# Patient Record
Sex: MALE | Race: WHITE | NOT HISPANIC OR LATINO | Employment: FULL TIME | URBAN - METROPOLITAN AREA
[De-identification: names, ages, dates, MRNs, and addresses within clinical notes are randomized per-mention and may not be internally consistent; named-entity substitution may affect disease eponyms.]

---

## 2023-10-18 ENCOUNTER — OFFICE VISIT (OUTPATIENT)
Dept: URGENT CARE | Facility: CLINIC | Age: 38
End: 2023-10-18
Payer: COMMERCIAL

## 2023-10-18 VITALS
RESPIRATION RATE: 14 BRPM | BODY MASS INDEX: 24.92 KG/M2 | HEART RATE: 72 BPM | WEIGHT: 184 LBS | OXYGEN SATURATION: 98 % | DIASTOLIC BLOOD PRESSURE: 88 MMHG | SYSTOLIC BLOOD PRESSURE: 138 MMHG | TEMPERATURE: 97.8 F | HEIGHT: 72 IN

## 2023-10-18 DIAGNOSIS — H66.90 ACUTE OTITIS MEDIA, UNSPECIFIED OTITIS MEDIA TYPE: Primary | ICD-10-CM

## 2023-10-18 PROCEDURE — 1036F TOBACCO NON-USER: CPT | Performed by: PHYSICIAN ASSISTANT

## 2023-10-18 PROCEDURE — 99203 OFFICE O/P NEW LOW 30 MIN: CPT | Performed by: PHYSICIAN ASSISTANT

## 2023-10-18 RX ORDER — FLUTICASONE PROPIONATE 50 MCG
1 SPRAY, SUSPENSION (ML) NASAL
COMMUNITY

## 2023-10-18 RX ORDER — MULTIVITAMIN
1 TABLET ORAL
COMMUNITY

## 2023-10-18 RX ORDER — AMOXICILLIN 500 MG/1
500 CAPSULE ORAL 3 TIMES DAILY
Qty: 30 CAPSULE | Refills: 0 | Status: SHIPPED | OUTPATIENT
Start: 2023-10-18 | End: 2023-10-28

## 2023-10-18 ASSESSMENT — PAIN SCALES - GENERAL: PAINLEVEL: 6

## 2023-10-18 NOTE — PROGRESS NOTES
Subjective   Patient ID: Dante Fernández is a 38 y.o. male.    Patient is a 38-year-old male who complains of worsening right ear pain that he has been experiencing for the past 3 days.  Patient reports low-grade fever and slight congestion but otherwise denies sinus pressure, sore throat or cough.  Patient states his left ear is asymptomatic and nontender.      Sore Throat   Associated symptoms include ear pain.   Earache       The following portions of the chart were reviewed this encounter and updated as appropriate:       Review of Systems   HENT:  Positive for ear pain.    All other systems reviewed and are negative.    Objective   Physical Exam  Constitutional:       Appearance: Normal appearance. He is normal weight.   HENT:      Head: Normocephalic and atraumatic.      Right Ear: External ear normal. There is no impacted cerumen.      Left Ear: Tympanic membrane, ear canal and external ear normal. There is no impacted cerumen.      Ears:      Comments: Left tympanic membrane is clear with excellent color and light reflex.  Right tympanic membrane is erythematous with an irregular, dull appearance and absent light reflex.  Exam of the bilateral external canals is unremarkable and the patient did not demonstrate tenderness with vacuum insertion and otic exam.     Nose: Nose normal. No congestion or rhinorrhea.      Mouth/Throat:      Mouth: Mucous membranes are moist.      Pharynx: Oropharynx is clear. No oropharyngeal exudate or posterior oropharyngeal erythema.   Eyes:      Extraocular Movements: Extraocular movements intact.      Conjunctiva/sclera: Conjunctivae normal.      Pupils: Pupils are equal, round, and reactive to light.   Cardiovascular:      Rate and Rhythm: Normal rate and regular rhythm.      Pulses: Normal pulses.      Heart sounds: Normal heart sounds.   Pulmonary:      Effort: Pulmonary effort is normal. No respiratory distress.      Breath sounds: Normal breath sounds. No stridor. No wheezing,  rhonchi or rales.   Musculoskeletal:      Cervical back: Normal range of motion and neck supple.   Skin:     General: Skin is warm and dry.      Capillary Refill: Capillary refill takes less than 2 seconds.   Neurological:      General: No focal deficit present.      Mental Status: He is alert and oriented to person, place, and time.   Psychiatric:         Mood and Affect: Mood normal.         Behavior: Behavior normal.         Thought Content: Thought content normal.         Judgment: Judgment normal.       Assessment/Plan   Physical exam findings as noted above.  Patient was provided with prescription for amoxicillin 500 mg and supportive care instructions were discussed.  Patient verbalizes clear understanding of same.    CLINICAL IMPRESSION:  Acute Otitis Media Right Ear    Diagnoses and all orders for this visit:  Acute otitis media, unspecified otitis media type  -     amoxicillin (Amoxil) 500 mg capsule; Take 1 capsule (500 mg) by mouth 3 times a day for 10 days.